# Patient Record
Sex: FEMALE | Race: OTHER | URBAN - METROPOLITAN AREA
[De-identification: names, ages, dates, MRNs, and addresses within clinical notes are randomized per-mention and may not be internally consistent; named-entity substitution may affect disease eponyms.]

---

## 2018-06-05 ENCOUNTER — OFFICE VISIT (OUTPATIENT)
Dept: FAMILY MEDICINE CLINIC | Age: 73
End: 2018-06-05

## 2018-06-05 VITALS
SYSTOLIC BLOOD PRESSURE: 151 MMHG | WEIGHT: 146 LBS | HEIGHT: 59 IN | BODY MASS INDEX: 29.43 KG/M2 | HEART RATE: 73 BPM | TEMPERATURE: 98.3 F | DIASTOLIC BLOOD PRESSURE: 78 MMHG

## 2018-06-05 DIAGNOSIS — Z79.4 CONTROLLED TYPE 2 DIABETES MELLITUS WITHOUT COMPLICATION, WITH LONG-TERM CURRENT USE OF INSULIN (HCC): Primary | ICD-10-CM

## 2018-06-05 DIAGNOSIS — E11.9 CONTROLLED TYPE 2 DIABETES MELLITUS WITHOUT COMPLICATION, WITH LONG-TERM CURRENT USE OF INSULIN (HCC): Primary | ICD-10-CM

## 2018-06-05 LAB — GLUCOSE POC: NORMAL MG/DL

## 2018-06-05 NOTE — PROGRESS NOTES
Printed AVS, provided to pt and reviewed. Pt indicated understanding and had no questions. Pt given written and signed rx. The pt was recommended to take the rx to any [facility]. The medication was reviewed with the pt. The  was #[id number]. The pt verbalized understanding.  Geraldine Heath RN

## 2018-06-05 NOTE — PROGRESS NOTES
Assessment/Plan:       ICD-10-CM ICD-9-CM    1. Controlled type 2 diabetes mellitus without complication, with long-term current use of insulin ([facility]) E11.9 250.00 AMB POC GLUCOSE BLOOD, BY GLUCOSE MONITORING DEVICE    Z79.4 V58.67 insulin NPH (NOVOLIN N, HUMULIN N) 100 unit/mL injection   She is cared for regularly by her doctor in [facility]. She was unable to get the prescription from [facility] filled at a pharmacy here. I explained she may take the printed rx with her; for the best price, I recommended [facility] Rel[facility]on brand. No further questions. Follow-up Disposition:  Return prn. No Pharmacies Listed    [facility] [address]  Subjective:     Chief Complaint   Patient presents with    Diabetes    Medication Refill    Gladis Head is a 75 y.o. OTHER female who speaks [place] (from [facility]).  used? yes. From [facility], needs insulin. She brought in her prescription written by her doctor in [facility]. I was confused by the sig which said NPH 18 units at 22. With the , patient explained that \"22\" indicates 10 pm ( time). Frankey Cain  # [id number]  NPH 18 units at 10 pm  She hasn't had her insulin for several days. Otherwise feels well. Social History: She reports that she has never smoked. She has never used smokeless tobacco. She reports that she does not drink alcohol or use illicit drugs. Family History: Her family history is not on file. ROS:   no polyuria or polydipsia, no chest pain, dyspnea or TIA's, no numbness, tingling or pain in extremities. Medications:    Current Outpatient Prescriptions:     insulin NPH (NOVOLIN N, HUMULIN N) 100 unit/mL injection, 18 Units by SubCUTAneous route nightly. At 10 pm., Disp: 1 Vial, Rfl: 2  Taking medications as prescribed? Except that she ran out.    Any problems to discuss? no  Objective:     Vitals:    06/05/18 1156   BP: 151/78   Pulse: 73   Temp: 98.3 °F (36.8 °C)   TempSrc: Oral   Weight: 146 lb (66.2 kg)   Height: 4' 11.41\" (1.509 m)    No LMP recorded. Patient is postmenopausal.    Results for orders placed or performed in visit on 06/05/18   AMB POC GLUCOSE BLOOD, BY GLUCOSE MONITORING DEVICE   Result Value Ref Range    Glucose POC nf 305 mg/dL      Wt Readings from Last 2 Encounters:   06/05/18 146 lb (66.2 kg)   Constitutional: She appears well-developed. Eyes: EOM are normal. Pupils are equal, round, and reactive to light. Neck: Neck supple. No thyromegaly present. Cardiovascular: Normal rate, regular rhythm, normal heart sounds and intact distal pulses. No murmur heard. Pulmonary/Chest: Effort normal and breath sounds normal.   Musculoskeletal: She exhibits no edema. No ulcers of the lower extremities. Assessment/Plan:   Diagnoses and all orders for this visit:    1. Controlled type 2 diabetes mellitus without complication, with long-term current use of insulin (HCC)  -     AMB POC GLUCOSE BLOOD, BY GLUCOSE MONITORING DEVICE  -     insulin NPH (NOVOLIN N, HUMULIN N) 100 unit/mL injection; 18 Units by SubCUTAneous route nightly. At 10 pm.      Diabetes Mellitus type 2, under uncertain control. Blood pressure under Fair control. Greater than 50% of this 25 minute visit was spent in face-to-face counseling/coordination of care regarding diabetes management. Follow-up Disposition:  Return prn. Prem Xiong DNP, FNP-BC, BC-ADM  Edmundo Bauertingjes Vergara expressed understanding of this plan.

## 2018-06-05 NOTE — PROGRESS NOTES
Results for orders placed or performed in visit on 06/05/18   AMB POC GLUCOSE BLOOD, BY GLUCOSE MONITORING DEVICE   Result Value Ref Range    Glucose POC nf 305 mg/dL

## 2018-06-05 NOTE — MR AVS SNAPSHOT
[address] 
[phone] Patient: Oswaldo Colon MRN: [id number] CMK:1/88/3499 Visit Information Date & Time Provider Department Dept. Phone Encounter #  
 6/5/2018 11:00 AM Laurie Bauer NP [facility] [address] [phone] [id number] Follow-up Instructions Return prn. Upcoming Health Maintenance Date Due DTaP/Tdap/Td series (1 - Tdap) 4/18/1965 BREAST CANCER SCRN MAMMOGRAM 4/18/1994 FOBT Q 1 YEAR AGE 50-75 4/18/1994 ZOSTER VACCINE AGE 60> 2/18/2004 GLAUCOMA SCREENING Q2Y 4/18/2009 Pneumococcal 65+ Low/Medium Risk (1 of 2 - PCV13) 4/18/2009 Influenza Age 5 to Adult 8/1/2018 Allergies as of 6/5/2018  Review Complete On: 6/5/2018 By: Laurie Bauer NP No Known Allergies Current Immunizations  Never Reviewed No immunizations on file. Not reviewed this visit You Were Diagnosed With   
  
 Codes Comments Encounter for screening    -  Primary ICD-10-CM: Z13.9 ICD-9-CM: V82.9 Controlled type 2 diabetes mellitus without complication, with long-term current use of insulin (HCC)     ICD-10-CM: E11.9, Z79.4 ICD-9-CM: 250.00, V58.67 Vitals BP Pulse Temp Height(growth percentile) Weight(growth percentile) BMI  
 151/78 (BP 1 Location: Left arm, BP Patient Position: Sitting) 73 98.3 °F (36.8 °C) (Oral) 4' 11.41\" (1.509 m) 146 lb (66.2 kg) 29.08 kg/m2 OB Status Smoking Status Postmenopausal Never Smoker BMI and BSA Data Body Mass Index Body Surface Area 29.08 kg/m 2 1.67 m 2 Your Updated Medication List  
  
   
This list is accurate as of 6/5/18 12:32 PM.  Always use your most recent med list.  
  
  
  
  
 insulin  unit/mL injection Commonly known as:  NOVOLIN N, HUMULIN N  
18 Units by SubCUTAneous route nightly. At 10 pm.  
  
  
  
  
Prescriptions Printed Refills insulin NPH (NOVOLIN N, HUMULIN N) 100 unit/mL injection 2 Si Units by SubCUTAneous route nightly. At 10 pm.  
 Class: Print Route: SubCUTAneous We Performed the Following AMB POC GLUCOSE BLOOD, BY GLUCOSE MONITORING DEVICE [23934 CPT(R)] Follow-up Instructions Return prn. Introducing [facility]! [facility] introduces [employer] patient portal. Now you can access parts of your medical record, email your doctor's office, and request medication refills online. 1. In your internet browser, go to [email or web address] 2. Click on the First Time User? Click Here link in the Sign In box. You will see the New Member Sign Up page. 3. Enter your [employer] Access Code exactly as it appears below. You will not need to use this code after youve completed the sign-up process. If you do not sign up before the expiration date, you must request a new code. · [employer] Access Code: [email or web address] Expires: 9/3/2018 12:31 PM 
 
4. Enter the last four digits of your Social Security Number (xxxx) and Date of Birth (mm/dd/yyyy) as indicated and click Submit. You will be taken to the next sign-up page. 5. Create a [employer] ID. This will be your [employer] login ID and cannot be changed, so think of one that is secure and easy to remember. 6. Create a [employer] password. You can change your password at any time. 7. Enter your Password Reset Question and Answer. This can be used at a later time if you forget your password. 8. Enter your e-mail address. You will receive e-mail notification when new information is available in [address] 9. Click Sign Up. You can now view and download portions of your medical record. 10. Click the Download Summary menu link to download a portable copy of your medical information. If you have questions, please visit the Frequently Asked Questions section of the [employer] website.  Remember, [employer] is NOT to be used for urgent needs. For medical emergencies, dial 911. Now available from your iPhone and Android! Please provide this summary of care documentation to your next provider. If you have any questions after today's visit, please call [phone].

## 2018-06-06 PROBLEM — E11.9 CONTROLLED TYPE 2 DIABETES MELLITUS WITHOUT COMPLICATION, WITH LONG-TERM CURRENT USE OF INSULIN (HCC): Status: ACTIVE | Noted: 2018-06-06

## 2018-06-06 PROBLEM — Z79.4 CONTROLLED TYPE 2 DIABETES MELLITUS WITHOUT COMPLICATION, WITH LONG-TERM CURRENT USE OF INSULIN (HCC): Status: ACTIVE | Noted: 2018-06-06

## 2019-06-05 ENCOUNTER — OFFICE VISIT (OUTPATIENT)
Dept: FAMILY MEDICINE CLINIC | Age: 74
End: 2019-06-05

## 2019-06-05 VITALS
SYSTOLIC BLOOD PRESSURE: 132 MMHG | WEIGHT: 135.8 LBS | HEART RATE: 67 BPM | BODY MASS INDEX: 27.38 KG/M2 | DIASTOLIC BLOOD PRESSURE: 75 MMHG | HEIGHT: 59 IN | OXYGEN SATURATION: 98 % | TEMPERATURE: 97.5 F

## 2019-06-05 DIAGNOSIS — Z79.4 CONTROLLED TYPE 2 DIABETES MELLITUS WITHOUT COMPLICATION, WITH LONG-TERM CURRENT USE OF INSULIN (HCC): Primary | ICD-10-CM

## 2019-06-05 DIAGNOSIS — E78.5 HYPERLIPIDEMIA, UNSPECIFIED HYPERLIPIDEMIA TYPE: ICD-10-CM

## 2019-06-05 DIAGNOSIS — E03.9 ACQUIRED HYPOTHYROIDISM: ICD-10-CM

## 2019-06-05 DIAGNOSIS — I10 ESSENTIAL HYPERTENSION: ICD-10-CM

## 2019-06-05 DIAGNOSIS — E11.9 CONTROLLED TYPE 2 DIABETES MELLITUS WITHOUT COMPLICATION, WITH LONG-TERM CURRENT USE OF INSULIN (HCC): Primary | ICD-10-CM

## 2019-06-05 LAB — GLUCOSE POC: NORMAL MG/DL

## 2019-06-05 RX ORDER — ALOGLIPTIN AND METFORMIN HYDROCHLORIDE 12.5; 1 MG/1; MG/1
1 TABLET, FILM COATED ORAL 2 TIMES DAILY WITH MEALS
Qty: 60 TAB | Refills: 3 | Status: SHIPPED | OUTPATIENT
Start: 2019-06-05

## 2019-06-05 RX ORDER — GLIPIZIDE 10 MG/1
10 TABLET ORAL 2 TIMES DAILY
Qty: 180 TAB | Refills: 2 | Status: SHIPPED | OUTPATIENT
Start: 2019-06-05

## 2019-06-05 RX ORDER — LEVOTHYROXINE SODIUM 75 UG/1
75 TABLET ORAL
Qty: 90 TAB | Refills: 2 | Status: SHIPPED | OUTPATIENT
Start: 2019-06-05

## 2019-06-05 RX ORDER — SIMVASTATIN 20 MG/1
20 TABLET, FILM COATED ORAL
Qty: 90 TAB | Refills: 3 | Status: SHIPPED | OUTPATIENT
Start: 2019-06-05

## 2019-06-05 RX ORDER — VALSARTAN AND HYDROCHLOROTHIAZIDE 160; 12.5 MG/1; MG/1
1 TABLET, FILM COATED ORAL DAILY
Qty: 90 TAB | Refills: 3 | Status: SHIPPED | OUTPATIENT
Start: 2019-06-05

## 2019-06-05 NOTE — PROGRESS NOTES
HISTORY OF PRESENT ILLNESS  Yared Connelly is a 75 y.o. female. HPI  Patient is visiting from [facility]. She will be here for a little over a month and  medications ran out. Also She has 2 medications that are too expensive at the pharmacy. The medications she was taking are kasano, jardiance, glipizide, levothyroxine, simvastatin and valsartan-hctz. No other concerns  Review of Systems   Constitutional: Negative for chills, fever, malaise/fatigue and weight loss. HENT: Negative for ear discharge, ear pain, hearing loss and tinnitus. Respiratory: Negative for cough, hemoptysis, sputum production and shortness of breath. Cardiovascular: Negative for chest pain, palpitations, orthopnea, claudication and leg swelling. Gastrointestinal: Negative for abdominal pain, heartburn, nausea and vomiting. Genitourinary: Negative for dysuria, frequency and urgency. Musculoskeletal: Negative for back pain, joint pain, myalgias and neck pain. Skin: Negative for itching and rash. Neurological: Negative for dizziness, tingling, tremors, sensory change and headaches. /75 (BP 1 Location: Right arm, BP Patient Position: Sitting)   Pulse 67   Temp 97.5 °F (36.4 °C) (Oral)   Ht 4' 10.82\" (1.494 m)   Wt 135 lb 12.8 oz (61.6 kg)   SpO2 98%   BMI 27.60 kg/m²   Physical Exam   Constitutional: She is oriented to person, place, and time. She appears well-developed and well-nourished. HENT:   Head: Normocephalic. Right Ear: External ear normal.   Left Ear: External ear normal.   Eyes: Pupils are equal, round, and reactive to light. Conjunctivae and EOM are normal.   Neck: Normal range of motion. Neck supple. No thyromegaly present. Cardiovascular: Normal rate, regular rhythm and normal heart sounds. No murmur heard. Pulmonary/Chest: Effort normal and breath sounds normal. No respiratory distress. She has no wheezes. Abdominal: Soft. She exhibits no distension.    Musculoskeletal: Normal range of motion. She exhibits no edema. Neurological: She is alert and oriented to person, place, and time. Skin: Skin is warm. ASSESSMENT and PLAN  Diagnoses and all orders for this visit:    1. Controlled type 2 diabetes mellitus without complication, with long-term current use of insulin (HCC)  -     AMB POC GLUCOSE BLOOD, BY GLUCOSE MONITORING DEVICE  -     glipiZIDE (GLUCOTROL) 10 mg tablet; Take 1 Tab by mouth two (2) times a day. -     alogliptin-metFORMIN (KAZANO) 12.5-1,000 mg per tablet; Take 1 Tab by mouth two (2) times daily (with meals). 2. Acquired hypothyroidism  -     levothyroxine (SYNTHROID) 75 mcg tablet; Take 1 Tab by mouth Daily (before breakfast). 3. Hyperlipidemia, unspecified hyperlipidemia type  -     simvastatin (ZOCOR) 20 mg tablet; Take 1 Tab by mouth nightly. 4. Essential hypertension  -     valsartan-hydroCHLOROthiazide (DIOVAN-HCT) 160-12.5 mg per tablet; Take 1 Tab by mouth daily. Medication will be refill for the next 90 days. ,  Good rx coupons given to patient  Follow up as needed

## 2019-06-05 NOTE — PROGRESS NOTES
Avs discussed with Kaia Russo by Discharge Nurse Kelly Faulkner LPN. Discussed medications prescribed today, good rx coupon printed and given to pt. Patient verbalized understanding and has no further questions.  AVS printed and given to patient Kelly Faulkner LPN

## 2019-06-05 NOTE — PROGRESS NOTES
Results for orders placed or performed in visit on 06/05/19   AMB POC GLUCOSE BLOOD, BY GLUCOSE MONITORING DEVICE   Result Value Ref Range    Glucose POC  mg/dL